# Patient Record
Sex: MALE | NOT HISPANIC OR LATINO | Employment: OTHER | URBAN - NONMETROPOLITAN AREA
[De-identification: names, ages, dates, MRNs, and addresses within clinical notes are randomized per-mention and may not be internally consistent; named-entity substitution may affect disease eponyms.]

---

## 2020-05-04 ENCOUNTER — HOSPITAL ENCOUNTER (OUTPATIENT)
Dept: GENERAL RADIOLOGY | Facility: HOSPITAL | Age: 45
Discharge: HOME | End: 2020-05-04
Admitting: NURSE PRACTITIONER

## 2020-05-04 ENCOUNTER — OFFICE VISIT (OUTPATIENT)
Dept: FAMILY MEDICINE CLINIC | Facility: CLINIC | Age: 45
End: 2020-05-04

## 2020-05-04 VITALS
DIASTOLIC BLOOD PRESSURE: 87 MMHG | RESPIRATION RATE: 18 BRPM | OXYGEN SATURATION: 99 % | HEIGHT: 74 IN | HEART RATE: 87 BPM | WEIGHT: 315 LBS | TEMPERATURE: 97.6 F | SYSTOLIC BLOOD PRESSURE: 132 MMHG | BODY MASS INDEX: 40.43 KG/M2

## 2020-05-04 DIAGNOSIS — S89.92XA INJURY OF LEFT KNEE, INITIAL ENCOUNTER: Primary | ICD-10-CM

## 2020-05-04 DIAGNOSIS — M25.562 ACUTE PAIN OF LEFT KNEE: ICD-10-CM

## 2020-05-04 PROBLEM — K21.9 GASTROESOPHAGEAL REFLUX DISEASE WITHOUT ESOPHAGITIS: Status: ACTIVE | Noted: 2020-05-04

## 2020-05-04 PROBLEM — T78.40XA ALLERGIES: Status: ACTIVE | Noted: 2020-05-04

## 2020-05-04 PROBLEM — IMO0002 UNCONTROLLED TYPE 2 DIABETES MELLITUS: Status: ACTIVE | Noted: 2020-05-04

## 2020-05-04 PROBLEM — I10 HTN (HYPERTENSION): Status: ACTIVE | Noted: 2020-05-04

## 2020-05-04 PROCEDURE — 73564 X-RAY EXAM KNEE 4 OR MORE: CPT

## 2020-05-04 PROCEDURE — 99203 OFFICE O/P NEW LOW 30 MIN: CPT | Performed by: NURSE PRACTITIONER

## 2020-05-04 RX ORDER — GLIMEPIRIDE 4 MG/1
4 TABLET ORAL
COMMUNITY

## 2020-05-04 RX ORDER — ONDANSETRON 4 MG/1
4 TABLET, ORALLY DISINTEGRATING ORAL EVERY 8 HOURS PRN
Qty: 10 TABLET | Refills: 0 | Status: CANCELLED | OUTPATIENT
Start: 2020-05-04

## 2020-05-04 RX ORDER — FLUTICASONE PROPIONATE 50 MCG
2 SPRAY, SUSPENSION (ML) NASAL DAILY
COMMUNITY

## 2020-05-04 RX ORDER — LISINOPRIL 40 MG/1
40 TABLET ORAL DAILY
COMMUNITY

## 2020-05-04 RX ORDER — CARVEDILOL 25 MG/1
25 TABLET ORAL 2 TIMES DAILY WITH MEALS
COMMUNITY

## 2020-05-04 RX ORDER — LORATADINE 10 MG/1
10 CAPSULE, LIQUID FILLED ORAL DAILY
COMMUNITY

## 2020-05-04 RX ORDER — TOPIRAMATE 50 MG/1
50 TABLET, FILM COATED ORAL 2 TIMES DAILY
COMMUNITY

## 2020-05-04 RX ORDER — HYDROCHLOROTHIAZIDE 25 MG/1
25 TABLET ORAL DAILY
COMMUNITY

## 2020-05-04 RX ORDER — FELODIPINE 10 MG/1
10 TABLET, EXTENDED RELEASE ORAL DAILY
COMMUNITY

## 2020-05-04 RX ORDER — GABAPENTIN 300 MG/1
300 CAPSULE ORAL 2 TIMES DAILY
COMMUNITY

## 2020-05-04 RX ORDER — ESOMEPRAZOLE MAGNESIUM 40 MG/1
FOR SUSPENSION ORAL
COMMUNITY
Start: 2019-02-14

## 2020-05-04 NOTE — PROGRESS NOTES
CC: Left knee injury    Lam Linda is a 44 yr old male here with complaints of an injury to his left knee 3 days ago.  He was walking and stepped into a hole and felt his knee pop.  Says he can not bear weight, he is unable to bend or straighten out left knee.  Says he has been limping with a cane rating pain 10+.says tylenol is not working for pain.   Has multiple other chronic problems, and leaves in [place].         Knee Pain    The incident occurred 3 to 5 days ago. The incident occurred in the street. The injury mechanism was a fall and a twisting injury. The pain is present in the left knee. The quality of the pain is described as aching, burning, shooting and stabbing. The pain is at a severity of 10/10. The pain is severe. The pain has been constant since onset. Associated symptoms include an inability to bear weight, a loss of motion, a loss of sensation, muscle weakness, numbness and tingling. He reports no foreign bodies present. The symptoms are aggravated by movement, palpation and weight bearing. He has tried ice, rest and NSAIDs for the symptoms. The treatment provided no relief.        Chronic problems:   gerd stable with esomeprazole, uncontrolled diabetes with farxiga, dulaglutide, amaryl, HTN stable with carvedilol, hctz, and lisinopril, neuropathy stable with not currently on any medications, seasonal allergies stable with flonase and clariten.      The following portions of the patient's history were reviewed and updated as appropriate: allergies, current medications, past family history, past medical history, past social history, past surgical history and problem list.    Review of Systems   Constitutional: Positive for activity change. Negative for fatigue and unexpected weight gain.   Respiratory: Negative for cough, choking, chest tightness and shortness of breath.    Cardiovascular: Negative for chest pain, palpitations and leg swelling.   Musculoskeletal: Positive for arthralgias,  gait problem, joint swelling and myalgias.   Skin: Negative.    Neurological: Positive for tingling and numbness.   Hematological: Negative.    Psychiatric/Behavioral: Negative.    All other systems reviewed and are negative.      Objective   Physical Exam   Musculoskeletal:        Legs:        Assessment/Plan   Lam was seen today for knee injury.    Diagnoses and all orders for this visit:    Injury of left knee, initial encounter  -     XR Knee 4+ View Left  -     Cancel: Tox[employer] Select 13 Discrete -    Acute pain of left knee        Left Knee xray:  IMPRESSION:   (message sent to nurse to call pt with results)  1. No acute osseous abnormality.  2. Joint effusion, Large.  This report was finalized on 05/04/2020 13:28 by Dr. Milo Jauregui MD.        Return for Go directly to Ortho.  [facility]   Pt given instructions how to get there.     Electronically signed by Duyen Miranda DNP, APRCRUZ, 05/04/20, 2:17 PM.

## 2020-05-05 ENCOUNTER — TELEPHONE (OUTPATIENT)
Dept: FAMILY MEDICINE CLINIC | Facility: CLINIC | Age: 45
End: 2020-05-05

## 2020-05-05 NOTE — TELEPHONE ENCOUNTER
PT CALLED AND STATED THAT HE WAS TOLD A COUPLE OF PAIN MEDICATIONS WERE GOING TO BE CALLED INTO HIS PHARMACY, PER HIS VISIT WITH JAKOB YESTERDAY.     PT STATED HIS PHARMACY NEVER RECEIVED ANYTHING.     PLEASE ADVISE PT.    PHARMACY: [facility] #[id number] - [place] - [address] - [phone]  - [phone]   [phone]    PT'S CALLBACK NUMBER: [phone]